# Patient Record
Sex: FEMALE | Race: WHITE | Employment: OTHER | URBAN - METROPOLITAN AREA
[De-identification: names, ages, dates, MRNs, and addresses within clinical notes are randomized per-mention and may not be internally consistent; named-entity substitution may affect disease eponyms.]

---

## 2017-05-24 ENCOUNTER — HOSPITAL ENCOUNTER (OUTPATIENT)
Dept: WOUND CARE | Age: 67
Discharge: HOME | End: 2017-05-24
Payer: MEDICARE

## 2017-05-24 PROCEDURE — 11042 DBRDMT SUBQ TIS 1ST 20SQCM/<: CPT

## 2017-05-24 PROCEDURE — 29580 STRAPPING UNNA BOOT: CPT

## 2017-05-31 PROCEDURE — 11042 DBRDMT SUBQ TIS 1ST 20SQCM/<: CPT

## 2017-05-31 PROCEDURE — 29580 STRAPPING UNNA BOOT: CPT

## 2017-05-31 PROCEDURE — 97602 WOUND(S) CARE NON-SELECTIVE: CPT

## 2017-06-07 ENCOUNTER — HOSPITAL ENCOUNTER (OUTPATIENT)
Dept: WOUND CARE | Age: 67
Discharge: HOME | End: 2017-06-07
Payer: MEDICARE

## 2017-06-07 PROCEDURE — 11042 DBRDMT SUBQ TIS 1ST 20SQCM/<: CPT

## 2017-06-14 PROCEDURE — 15276 SKIN SUB GRAFT F/N/HF/G ADDL: CPT

## 2017-06-14 PROCEDURE — 11042 DBRDMT SUBQ TIS 1ST 20SQCM/<: CPT

## 2017-06-14 PROCEDURE — 15275 SKIN SUB GRAFT FACE/NK/HF/G: CPT

## 2017-06-23 PROCEDURE — 29580 STRAPPING UNNA BOOT: CPT

## 2018-01-11 ENCOUNTER — HOSPITAL ENCOUNTER (EMERGENCY)
Age: 68
Discharge: ANOTHER HOSPITAL | End: 2018-01-12
Attending: EMERGENCY MEDICINE
Payer: MEDICARE

## 2018-01-11 ENCOUNTER — APPOINTMENT (OUTPATIENT)
Dept: GENERAL RADIOLOGY | Age: 68
End: 2018-01-11
Attending: PHYSICIAN ASSISTANT
Payer: MEDICARE

## 2018-01-11 VITALS
HEART RATE: 91 BPM | SYSTOLIC BLOOD PRESSURE: 120 MMHG | OXYGEN SATURATION: 95 % | BODY MASS INDEX: 30.65 KG/M2 | WEIGHT: 173 LBS | HEIGHT: 63 IN | DIASTOLIC BLOOD PRESSURE: 81 MMHG | RESPIRATION RATE: 11 BRPM | TEMPERATURE: 98.8 F

## 2018-01-11 DIAGNOSIS — L03.115 BILATERAL CELLULITIS OF LOWER LEG: ICD-10-CM

## 2018-01-11 DIAGNOSIS — M86.172 OSTEOMYELITIS OF FOOT, LEFT, ACUTE (HCC): Primary | ICD-10-CM

## 2018-01-11 DIAGNOSIS — L03.116 BILATERAL CELLULITIS OF LOWER LEG: ICD-10-CM

## 2018-01-11 DIAGNOSIS — Z79.01 ANTICOAGULANT LONG-TERM USE: ICD-10-CM

## 2018-01-11 DIAGNOSIS — E87.6 HYPOKALEMIA: ICD-10-CM

## 2018-01-11 DIAGNOSIS — A41.9 SEPSIS, DUE TO UNSPECIFIED ORGANISM: ICD-10-CM

## 2018-01-11 DIAGNOSIS — D64.9 ANEMIA, UNSPECIFIED TYPE: ICD-10-CM

## 2018-01-11 LAB
ALBUMIN SERPL-MCNC: 2.3 G/DL (ref 3.4–5)
ALBUMIN/GLOB SERPL: 0.5 {RATIO} (ref 0.8–1.7)
ALP SERPL-CCNC: 131 U/L (ref 45–117)
ALT SERPL-CCNC: 18 U/L (ref 13–56)
ANION GAP SERPL CALC-SCNC: 7 MMOL/L (ref 3–18)
APPEARANCE UR: CLEAR
APTT PPP: 57.3 SEC (ref 23–36.4)
AST SERPL-CCNC: 19 U/L (ref 15–37)
BASOPHILS # BLD: 0 K/UL (ref 0–0.06)
BASOPHILS NFR BLD: 0 % (ref 0–2)
BILIRUB SERPL-MCNC: 0.3 MG/DL (ref 0.2–1)
BILIRUB UR QL: NEGATIVE
BNP SERPL-MCNC: 4216 PG/ML (ref 0–900)
BUN SERPL-MCNC: 17 MG/DL (ref 7–18)
BUN/CREAT SERPL: 11 (ref 12–20)
CALCIUM SERPL-MCNC: 8.7 MG/DL (ref 8.5–10.1)
CHLORIDE SERPL-SCNC: 98 MMOL/L (ref 100–108)
CK MB CFR SERPL CALC: 1.9 % (ref 0–4)
CK MB SERPL-MCNC: 4.6 NG/ML (ref 5–25)
CK SERPL-CCNC: 242 U/L (ref 26–192)
CO2 SERPL-SCNC: 32 MMOL/L (ref 21–32)
COLOR UR: YELLOW
CREAT SERPL-MCNC: 1.49 MG/DL (ref 0.6–1.3)
CRP SERPL-MCNC: 21 MG/DL (ref 0–0.3)
DIFFERENTIAL METHOD BLD: ABNORMAL
EOSINOPHIL # BLD: 0.2 K/UL (ref 0–0.4)
EOSINOPHIL NFR BLD: 1 % (ref 0–5)
ERYTHROCYTE [DISTWIDTH] IN BLOOD BY AUTOMATED COUNT: 17.9 % (ref 11.6–14.5)
ERYTHROCYTE [SEDIMENTATION RATE] IN BLOOD: 106 MM/HR (ref 0–30)
EST. AVERAGE GLUCOSE BLD GHB EST-MCNC: 146 MG/DL
GLOBULIN SER CALC-MCNC: 4.7 G/DL (ref 2–4)
GLUCOSE SERPL-MCNC: 128 MG/DL (ref 74–99)
GLUCOSE UR STRIP.AUTO-MCNC: NEGATIVE MG/DL
HBA1C MFR BLD: 6.7 % (ref 4.5–5.6)
HCT VFR BLD AUTO: 28.5 % (ref 35–45)
HGB BLD-MCNC: 8.4 G/DL (ref 12–16)
HGB UR QL STRIP: NEGATIVE
INR PPP: 2 (ref 0.8–1.2)
KETONES UR QL STRIP.AUTO: NEGATIVE MG/DL
LACTATE SERPL-SCNC: 1 MMOL/L (ref 0.4–2)
LACTATE SERPL-SCNC: 2.1 MMOL/L (ref 0.4–2)
LEUKOCYTE ESTERASE UR QL STRIP.AUTO: NEGATIVE
LYMPHOCYTES # BLD: 2.3 K/UL (ref 0.9–3.6)
LYMPHOCYTES NFR BLD: 14 % (ref 21–52)
MAGNESIUM SERPL-MCNC: 1.8 MG/DL (ref 1.6–2.6)
MCH RBC QN AUTO: 21.7 PG (ref 24–34)
MCHC RBC AUTO-ENTMCNC: 29.5 G/DL (ref 31–37)
MCV RBC AUTO: 73.6 FL (ref 74–97)
MONOCYTES # BLD: 0.8 K/UL (ref 0.05–1.2)
MONOCYTES NFR BLD: 5 % (ref 3–10)
NEUTS SEG # BLD: 13.3 K/UL (ref 1.8–8)
NEUTS SEG NFR BLD: 80 % (ref 40–73)
NITRITE UR QL STRIP.AUTO: NEGATIVE
PH UR STRIP: 5 [PH] (ref 5–8)
PLATELET # BLD AUTO: 524 K/UL (ref 135–420)
PMV BLD AUTO: 8.5 FL (ref 9.2–11.8)
POTASSIUM SERPL-SCNC: 2.8 MMOL/L (ref 3.5–5.5)
PROT SERPL-MCNC: 7 G/DL (ref 6.4–8.2)
PROT UR STRIP-MCNC: NEGATIVE MG/DL
PROTHROMBIN TIME: 21.9 SEC (ref 11.5–15.2)
RBC # BLD AUTO: 3.87 M/UL (ref 4.2–5.3)
RBC MORPH BLD: ABNORMAL
SODIUM SERPL-SCNC: 137 MMOL/L (ref 136–145)
SP GR UR REFRACTOMETRY: 1.01 (ref 1–1.03)
TROPONIN I SERPL-MCNC: 0.02 NG/ML (ref 0–0.06)
UROBILINOGEN UR QL STRIP.AUTO: 1 EU/DL (ref 0.2–1)
WBC # BLD AUTO: 16.6 K/UL (ref 4.6–13.2)

## 2018-01-11 PROCEDURE — 87040 BLOOD CULTURE FOR BACTERIA: CPT | Performed by: PHYSICIAN ASSISTANT

## 2018-01-11 PROCEDURE — 74011250636 HC RX REV CODE- 250/636: Performed by: PHYSICIAN ASSISTANT

## 2018-01-11 PROCEDURE — 77030013140 HC MSK NEB VYRM -A

## 2018-01-11 PROCEDURE — 74011000250 HC RX REV CODE- 250: Performed by: PHYSICIAN ASSISTANT

## 2018-01-11 PROCEDURE — 96366 THER/PROPH/DIAG IV INF ADDON: CPT

## 2018-01-11 PROCEDURE — 83605 ASSAY OF LACTIC ACID: CPT | Performed by: PHYSICIAN ASSISTANT

## 2018-01-11 PROCEDURE — 96375 TX/PRO/DX INJ NEW DRUG ADDON: CPT

## 2018-01-11 PROCEDURE — 96365 THER/PROPH/DIAG IV INF INIT: CPT

## 2018-01-11 PROCEDURE — 83735 ASSAY OF MAGNESIUM: CPT | Performed by: PHYSICIAN ASSISTANT

## 2018-01-11 PROCEDURE — 96368 THER/DIAG CONCURRENT INF: CPT

## 2018-01-11 PROCEDURE — 94640 AIRWAY INHALATION TREATMENT: CPT

## 2018-01-11 PROCEDURE — 96376 TX/PRO/DX INJ SAME DRUG ADON: CPT

## 2018-01-11 PROCEDURE — 81003 URINALYSIS AUTO W/O SCOPE: CPT | Performed by: EMERGENCY MEDICINE

## 2018-01-11 PROCEDURE — 80053 COMPREHEN METABOLIC PANEL: CPT | Performed by: PHYSICIAN ASSISTANT

## 2018-01-11 PROCEDURE — 73630 X-RAY EXAM OF FOOT: CPT

## 2018-01-11 PROCEDURE — 85610 PROTHROMBIN TIME: CPT | Performed by: PHYSICIAN ASSISTANT

## 2018-01-11 PROCEDURE — 93005 ELECTROCARDIOGRAM TRACING: CPT

## 2018-01-11 PROCEDURE — 93970 EXTREMITY STUDY: CPT

## 2018-01-11 PROCEDURE — 96361 HYDRATE IV INFUSION ADD-ON: CPT

## 2018-01-11 PROCEDURE — 83880 ASSAY OF NATRIURETIC PEPTIDE: CPT | Performed by: PHYSICIAN ASSISTANT

## 2018-01-11 PROCEDURE — 87186 SC STD MICRODIL/AGAR DIL: CPT | Performed by: PHYSICIAN ASSISTANT

## 2018-01-11 PROCEDURE — 87077 CULTURE AEROBIC IDENTIFY: CPT | Performed by: PHYSICIAN ASSISTANT

## 2018-01-11 PROCEDURE — 99285 EMERGENCY DEPT VISIT HI MDM: CPT

## 2018-01-11 PROCEDURE — 82553 CREATINE MB FRACTION: CPT | Performed by: PHYSICIAN ASSISTANT

## 2018-01-11 PROCEDURE — 85730 THROMBOPLASTIN TIME PARTIAL: CPT | Performed by: PHYSICIAN ASSISTANT

## 2018-01-11 PROCEDURE — 83036 HEMOGLOBIN GLYCOSYLATED A1C: CPT | Performed by: PHYSICIAN ASSISTANT

## 2018-01-11 PROCEDURE — 87070 CULTURE OTHR SPECIMN AEROBIC: CPT | Performed by: PHYSICIAN ASSISTANT

## 2018-01-11 PROCEDURE — 85652 RBC SED RATE AUTOMATED: CPT | Performed by: PHYSICIAN ASSISTANT

## 2018-01-11 PROCEDURE — 71045 X-RAY EXAM CHEST 1 VIEW: CPT

## 2018-01-11 PROCEDURE — 87086 URINE CULTURE/COLONY COUNT: CPT | Performed by: PHYSICIAN ASSISTANT

## 2018-01-11 PROCEDURE — 86140 C-REACTIVE PROTEIN: CPT | Performed by: PHYSICIAN ASSISTANT

## 2018-01-11 PROCEDURE — 85025 COMPLETE CBC W/AUTO DIFF WBC: CPT | Performed by: PHYSICIAN ASSISTANT

## 2018-01-11 RX ORDER — ONDANSETRON 2 MG/ML
4 INJECTION INTRAMUSCULAR; INTRAVENOUS
Status: COMPLETED | OUTPATIENT
Start: 2018-01-11 | End: 2018-01-11

## 2018-01-11 RX ORDER — WARFARIN 1 MG/1
4 TABLET ORAL DAILY
COMMUNITY

## 2018-01-11 RX ORDER — POTASSIUM CHLORIDE 7.45 MG/ML
10 INJECTION INTRAVENOUS
Status: COMPLETED | OUTPATIENT
Start: 2018-01-11 | End: 2018-01-11

## 2018-01-11 RX ORDER — METFORMIN HYDROCHLORIDE 500 MG/1
500 TABLET ORAL 2 TIMES DAILY WITH MEALS
COMMUNITY

## 2018-01-11 RX ORDER — VANCOMYCIN HCL IN 5 % DEXTROSE 1.25 G/25
1250 PLASTIC BAG, INJECTION (ML) INTRAVENOUS ONCE
Status: COMPLETED | OUTPATIENT
Start: 2018-01-11 | End: 2018-01-11

## 2018-01-11 RX ORDER — OMEPRAZOLE 40 MG/1
40 CAPSULE, DELAYED RELEASE ORAL 2 TIMES DAILY
COMMUNITY

## 2018-01-11 RX ORDER — FENTANYL 25 UG/1
1 PATCH TRANSDERMAL
COMMUNITY

## 2018-01-11 RX ORDER — ALBUTEROL SULFATE 0.83 MG/ML
2.5 SOLUTION RESPIRATORY (INHALATION)
Status: COMPLETED | OUTPATIENT
Start: 2018-01-11 | End: 2018-01-11

## 2018-01-11 RX ORDER — ASPIRIN 325 MG
325 TABLET ORAL DAILY
COMMUNITY

## 2018-01-11 RX ORDER — MORPHINE SULFATE 4 MG/ML
4 INJECTION INTRAVENOUS
Status: COMPLETED | OUTPATIENT
Start: 2018-01-11 | End: 2018-01-11

## 2018-01-11 RX ORDER — FENTANYL CITRATE 50 UG/ML
50 INJECTION, SOLUTION INTRAMUSCULAR; INTRAVENOUS
Status: COMPLETED | OUTPATIENT
Start: 2018-01-11 | End: 2018-01-11

## 2018-01-11 RX ORDER — TRAMADOL HYDROCHLORIDE 50 MG/1
50 TABLET ORAL
COMMUNITY

## 2018-01-11 RX ORDER — SODIUM CHLORIDE 0.9 % (FLUSH) 0.9 %
5-10 SYRINGE (ML) INJECTION AS NEEDED
Status: DISCONTINUED | OUTPATIENT
Start: 2018-01-11 | End: 2018-01-12 | Stop reason: HOSPADM

## 2018-01-11 RX ORDER — BUDESONIDE AND FORMOTEROL FUMARATE DIHYDRATE 160; 4.5 UG/1; UG/1
2 AEROSOL RESPIRATORY (INHALATION)
Status: DISCONTINUED | OUTPATIENT
Start: 2018-01-11 | End: 2018-01-11 | Stop reason: CLARIF

## 2018-01-11 RX ORDER — FUROSEMIDE 80 MG/1
40 TABLET ORAL DAILY
COMMUNITY

## 2018-01-11 RX ORDER — GABAPENTIN 600 MG/1
600 TABLET ORAL
COMMUNITY

## 2018-01-11 RX ORDER — INSULIN GLARGINE 100 [IU]/ML
30 INJECTION, SOLUTION SUBCUTANEOUS
COMMUNITY

## 2018-01-11 RX ADMIN — ALBUTEROL SULFATE 2.5 MG: 2.5 SOLUTION RESPIRATORY (INHALATION) at 21:52

## 2018-01-11 RX ADMIN — FENTANYL CITRATE 50 MCG: 50 INJECTION, SOLUTION INTRAMUSCULAR; INTRAVENOUS at 16:42

## 2018-01-11 RX ADMIN — SODIUM CHLORIDE 500 ML: 900 INJECTION, SOLUTION INTRAVENOUS at 16:41

## 2018-01-11 RX ADMIN — ONDANSETRON 4 MG: 2 INJECTION INTRAMUSCULAR; INTRAVENOUS at 20:31

## 2018-01-11 RX ADMIN — MORPHINE SULFATE 4 MG: 4 INJECTION INTRAVENOUS at 23:37

## 2018-01-11 RX ADMIN — ONDANSETRON 4 MG: 2 INJECTION INTRAMUSCULAR; INTRAVENOUS at 23:37

## 2018-01-11 RX ADMIN — SODIUM CHLORIDE 500 ML: 900 INJECTION, SOLUTION INTRAVENOUS at 18:41

## 2018-01-11 RX ADMIN — POTASSIUM CHLORIDE 10 MEQ: 10 INJECTION, SOLUTION INTRAVENOUS at 18:57

## 2018-01-11 RX ADMIN — MORPHINE SULFATE 4 MG: 4 INJECTION INTRAVENOUS at 18:42

## 2018-01-11 RX ADMIN — POTASSIUM CHLORIDE 10 MEQ: 10 INJECTION, SOLUTION INTRAVENOUS at 20:32

## 2018-01-11 RX ADMIN — PIPERACILLIN SODIUM AND TAZOBACTAM SODIUM 4.5 G: .5; 4 INJECTION, POWDER, LYOPHILIZED, FOR SOLUTION INTRAVENOUS at 18:42

## 2018-01-11 RX ADMIN — VANCOMYCIN HYDROCHLORIDE 1250 MG: 10 INJECTION, POWDER, LYOPHILIZED, FOR SOLUTION INTRAVENOUS at 20:02

## 2018-01-11 RX ADMIN — ONDANSETRON 4 MG: 2 INJECTION INTRAMUSCULAR; INTRAVENOUS at 18:42

## 2018-01-11 RX ADMIN — ONDANSETRON 4 MG: 2 INJECTION INTRAMUSCULAR; INTRAVENOUS at 16:41

## 2018-01-11 NOTE — ED TRIAGE NOTES
Pt presents to ED today via EMS for c/o left foot pain. Pt has wound on left foot that is currently being treated by home health. Pt sees Dr. Lashawn Mtz for this. Pt has Fentanyl patch on right arm and takes tramadol as well for pain. Pt states she did not take any tramadol today. Pt demanding pain medicine upon arrival. Pt has foul smelling left foot with mal-aligned toes and wound to medial side.

## 2018-01-11 NOTE — ED PROVIDER NOTES
EMERGENCY DEPARTMENT HISTORY AND PHYSICAL EXAM    Date: 1/11/2018  Patient Name: Yane Mccurdy    History of Presenting Illness     Chief Complaint   Patient presents with    Foot Pain       History Provided By: Patient    Chief Complaint: foot pain  Duration: 2 Weeks  Location: left   Severity: 10 out of 10  Associated Symptoms: left foot swelling, left foot erythema, left foot wound, left wound drainage, left foot odor, fever, nausea, vomiting, left hip pain, and left sided back pain. Additional History (Context):   2:48 PM  Yane Mccurdy is a 74 y.o. female with PMHX of HTN, IDDM, PAD, COPD (still smoking), AFib, DVT (on coumadin), and diabetic neuropathy who presents to the emergency department via EMS C/O 10/10 left foot swelling, erythema onset 2 weeks ago. Associated symptoms include foul malodorous drainage, subjective fever, nausea and vomiting. Baseline blood sugar 170-200. Followed Dr. Julian Oliveira, [facility] for chronic diabetic ulcer in left foot. Pt receiving home health wound care. Reports she fallen on the floor twice in the past 3 days. Pmhx of CHF, MI, COPD, and Afib (rx Coumadin). Pain medication include Fentanyl patch and Tramadol. Tobacco usage 1/2 pack daily. Pt denies  any other Sx or complaints. PCP: Yakelin Rocha, DO    Current Outpatient Prescriptions   Medication Sig Dispense Refill    gabapentin (NEURONTIN) 600 mg tablet Take 600 mg by mouth five (5) times daily.  fentaNYL (DURAGESIC) 25 mcg/hr PATCH 1 Patch by TransDERmal route every seventy-two (72) hours.  traMADol (ULTRAM) 50 mg tablet Take 50 mg by mouth every six (6) hours as needed for Pain.  metFORMIN (GLUCOPHAGE) 500 mg tablet Take 500 mg by mouth two (2) times daily (with meals).  insulin glargine (LANTUS) 100 unit/mL injection 30 Units by SubCUTAneous route nightly.  furosemide (LASIX) 80 mg tablet Take 80 mg by mouth two (2) times a day.       omeprazole (PRILOSEC) 40 mg capsule Take 40 mg by mouth two (2) times a day.  aspirin (ASPIRIN) 325 mg tablet Take 325 mg by mouth daily.  warfarin (COUMADIN) 1 mg tablet Take 1 mg by mouth daily. Past History     Past Medical History:  Past Medical History:   Diagnosis Date    [facility]     Asthma     Back pain     CAD (coronary artery disease)     Cardiac disease     Cardi[facility]opathy ([facility]     Cellulitis of foot     [facility]congestive heart failure) ([facility])     COPD (chronic obstructive pulmonary disease) ([facility])     COPD (chronic obstructive pulmonary disease) ([facility])     Decreased exercise tolerance     Depression     Diabetes ([facility])     Embolism and thrombosis of arteries of lower extremity ([facility])     Foot ulcer ([facility])     Head ache     HTN (hypertension)     Hypercholesteremia     Hypoxia     Ischemic ulcer ([facility])     Kidney disease     Leg pain     Leg weakness     MRSA (methicillin resistant staph aureus) culture positive     Mural thrombus of cardiac apex     Muscle atrophy     Neuropathy     Osteomyelitis ([facility])     PAD (peripheral artery disease) ([facility])     SIRS (systemic inflammatory response syndrome) ([facility])     UTI (lower urinary tract infection)     V tach ([facility])        Past Surgical History:  Past Surgical History:   Procedure Laterality Date    HX APPENDECTOMY      HX CORONARY STENT PLACEMENT      HX FREE SKIN GRAFT      HX HERNIA REPAIR      HX HYSTERECTOMY      HX OTHER SURGICAL      femoral endarectomy    HX THROMBECTOMY      HX TUBAL LIGATION      CT DEBRIDEMENT OPEN WOUND 20 SQ CM<      VASCULAR SURGERY PROCEDURE UNLIST         Family History:  Family History   Problem Relation Age of Onset    Heart Disease Mother     Hypertension Mother        Social History:  Social History   Substance Use Topics    Smoking status: Current Every Day Smoker    Smokeless tobacco: Never Used    Alcohol use No       Allergies:   Allergies   Allergen Reactions    Codeine Rash         Review of Systems   Review of Systems   Constitutional: Positive for fever. Negative for chills. Respiratory: Negative for cough and shortness of breath. Cardiovascular: Positive for leg swelling. Gastrointestinal: Positive for nausea and vomiting. Negative for abdominal pain. Genitourinary: Negative for dysuria. Musculoskeletal: Positive for arthralgias (left hip pain), back pain (left sided) and myalgias (left foot pain). Negative for joint swelling.        (+) left foot swelling  (+) left foot odor     Skin: Positive for color change (left foot erythema) and wound (left foot ). Negative for rash. (+) left foot wound drainage   Neurological: Positive for numbness (chronic neuropathy in feet). Negative for weakness. Hematological: Negative for adenopathy. Psychiatric/Behavioral: Negative for confusion. All other systems reviewed and are negative. Physical Exam     Vitals:    01/11/18 1416 01/11/18 1830 01/11/18 2230   BP: 138/88 127/65 120/81   Pulse: 95 97 91   Resp: 16 16 11   Temp: 98.8 °F (37.1 °C)     SpO2: 98% 96% 95%   Weight: 78.5 kg (173 lb)     Height: 5' 3\" (1.6 m)       Physical Exam   Constitutional: She appears well-developed and well-nourished. She appears ill. No distress. Chronically ill appearing  female in NAD. Alert. Answering questions. Following directions. HENT:   Head: Normocephalic and atraumatic. Right Ear: External ear normal.   Left Ear: External ear normal.   Nose: Nose normal.   Mouth/Throat: Uvula is midline and oropharynx is clear and moist. Mucous membranes are dry. Eyes: Conjunctivae are normal. Right eye exhibits no discharge. Left eye exhibits no discharge. Neck: Normal range of motion. Cardiovascular: Normal rate, regular rhythm, normal heart sounds and intact distal pulses. Exam reveals no gallop and no friction rub. No murmur heard.   Pulses:       Dorsalis pedis pulses are 1+ on the right side, and 1+ on the left side. Posterior tibial pulses are 1+ on the right side, and 1+ on the left side. Doppler appreciated to BLE at PT/DP pulses   Pulmonary/Chest: Effort normal and breath sounds normal. No accessory muscle usage. No tachypnea. No respiratory distress. She has no decreased breath sounds. She has no wheezes. She has no rhonchi. She has no rales. Musculoskeletal:        Right lower leg: She exhibits tenderness, swelling and edema. Left lower leg: She exhibits tenderness and swelling. Legs:       Left foot: There is swelling and deformity (chronic appearing). Feet:    Neurological: She is alert. Skin: Skin is warm and dry. No rash noted. She is not diaphoretic. No erythema. Psychiatric: She has a normal mood and affect. Judgment normal.   Nursing note and vitals reviewed. Diagnostic Study Results     Labs -     No results found for this or any previous visit (from the past 12 hour(s)). Radiologic Studies -  XR FOOT LT MIN 3 V   Final Result    IMPRESSION:     Dislocated second MTP joint. Mild irregularity distal cortical surface medially  of the second metatarsal head, possibly chronic degenerative, cannot entirely  exclude an erosion which could be seen with osteomyelitis. Please correlate  clinically with regards to the site of the patient's infection.     Third through fifth hammertoes with overlap, no other definite erosive changes. Postoperative changes first metatarsal, partial amputation.     No soft tissue gas. As read by the radiologist.      XR CHEST PORT   Final Result    IMPRESSION:     Nonspecific mild reticular interstitial prominence. No focal consolidation. As read by the radiologist.      DUPLEX LOWER EXT VENOUS BILAT      Duplex images were obtained using 2-D gray scale, color flow, and  spectral Doppler analysis. Right leg :  1.  Deep vein(s) visualized include the common femoral, deep femoral,  proximal femoral, mid femoral, distal femoral, popliteal(above knee),  popliteal(fossa) and popliteal(below knee) veins. 2. No evidence of deep venous thrombosis detected in the veins  visualized. 3. No evidence of superficial thrombosis detected.     Left leg :  1. Deep vein(s) visualized include the common femoral, deep femoral,  proximal femoral, mid femoral, distal femoral, popliteal(above knee),  popliteal(fossa) and popliteal(below knee) veins. 2. No evidence of deep venous thrombosis detected in the veins  visualized. 3. No evidence of superficial thrombosis detected.     ADDITIONAL COMMENTS  Bilateral calf vessels not visualized secondary to swelling     I have personally reviewed the data relevant to the interpretation of  this study.     TECHNOLOGIST: Samson Setting  Signed: 01/11/2018 05:31 PM     CT Results  (Last 48 hours)    None        CXR Results  (Last 48 hours)               01/11/18 1551  XR CHEST PORT Final result    Impression:  IMPRESSION:       Nonspecific mild reticular interstitial prominence. No focal consolidation. Narrative:  EXAM: Chest, portable upright, one view       INDICATION: COPD. COMPARISON: None.       _______________       FINDINGS:       Cardiac silhouette is top normal in size. Mild reticular interstitial markings   are present throughout both lungs. No focal consolidation. The costophrenic   angles are sharp. No pneumothorax. Old, healed bilateral rib fracture   deformities are noted.        _______________                 Medications given in the ED-  Medications   fentaNYL citrate (PF) injection 50 mcg (50 mcg IntraVENous Given 1/11/18 1642)   ondansetron (ZOFRAN) injection 4 mg (4 mg IntraVENous Given 1/11/18 1641)   sodium chloride 0.9 % bolus infusion 500 mL (0 mL IntraVENous IV Completed 1/11/18 2000)   vancomycin (VANCOCIN) 1250 mg in D5W 250 mL infusion (1,250 mg IntraVENous Given 1/11/18 2002)   piperacillin-tazobactam (ZOSYN) 4.5 g in  ml premix/cpmd (4.5 g IntraVENous Given 1/11/18 1842) potassium chloride 10 mEq in 100 ml IVPB (0 mEq IntraVENous IV Completed 1/11/18 2000)   sodium chloride 0.9 % bolus infusion 500 mL (0 mL IntraVENous IV Completed 1/11/18 2000)   morphine 4 mg (4 mg IntraVENous Given 1/11/18 1842)   ondansetron (ZOFRAN) injection 4 mg (4 mg IntraVENous Given 1/11/18 1842)   ondansetron (ZOFRAN) injection 4 mg (4 mg IntraVENous Given 1/11/18 2031)   potassium chloride 10 mEq in 100 ml IVPB (0 mEq IntraVENous IV Completed 1/11/18 2147)   albuterol (PROVENTIL VENTOLIN) nebulizer solution 2.5 mg (2.5 mg Nebulization Given 1/11/18 2152)   morphine 4 mg (4 mg IntraVENous Given 1/11/18 2337)   ondansetron (ZOFRAN) injection 4 mg (4 mg IntraVENous Given 1/11/18 2337)         Medical Decision Making   I am the first provider for this patient. I reviewed the vital signs, available nursing notes, past medical history, past surgical history, family history and social history. Vital Signs-Reviewed the patient's vital signs. Pulse Oximetry Analysis - 98% on RA     EKG interpretation: (Preliminary)  Rate 93 bpm. Rhythm: Sinus rhythm with premature atrial complexes. Left axis deviation. RBBB. Anteroseptal infarct. No acute ischemic changes. Inverted T waves in V2-V4. EKG read by Mary Omalley PA-C at 4:30 PM    Records Reviewed: Nursing Notes and Old Medical Records    Procedures:  Procedures    ED Course:   2:48 PM Initial assessment performed. The patients presenting problems have been discussed, and they are in agreement with the care plan formulated and outlined with them. I have encouraged them to ask questions as they arise throughout their visit.     SEPSIS ASSESSMENT NOTE:   3:05 PM  Mary Omalley PA-C has seen and assessed the patient as follows:    Capillary Refill:abnormal/slow  Cardiopulmonary Evaluation:   Lung Sounds: clear   Cardiac Sounds: irregular  Peripheral Pulses: weak, doppler  Skin Exam: erythematous, warm    Visit Vitals    /81    Pulse 91    Temp 98.8 °F (37.1 °C)    Resp 11    Ht 5' 3\" (1.6 m)    Wt 78.5 kg (173 lb)    SpO2 95%    BMI 30.65 kg/m2     4:56 PM Patient has meet sepsis criteria. Will give antibiotics. Blood pressure remains stable. 5:38 PM :Attempts to contact Zh[facility]  without succuss so called on call podiatrist Milton Elam. Discussed patient's history, exam, and available diagnostics results with , podiatry, who agree with treatment and attempting wound culture. He wants us to order MRI with and without and admit to medicine. 5:55 PM Discussed patient's history, exam, and available diagnostics results with Enrrique Thomas MD, hospitalist, who does not feel comfortable admitting pt because there is no ID coverage at this hospitals. He recommends transfer to [facility]. 6:00 PM Discussed with pt and she request transfer to [place], [facility] Will contact Medicine for transfer. 6:21 PM Discussed patient's history, exam, and available diagnostics results with Milton Elam podiatrist, who does not have privileges at [facility]. I will contact medicine for transfer. 6:58 PM Received call from [facility] transfer stating the Hospitalist has two critical pts and will not be able to call back for at least an hour. 7:03 PM Discussed patient's history, exam, and available diagnostics results with Beto Dimas MD, ED attending , who agree with plan. Will consult [place]. 8:05 PM  Will give second run of K+ as await call back from [facility]:  7:45 PM  Patient's presentation, labs/imaging and plan of care was reviewed with Jameel Biswas PA-C. as part of sign out. They will consult [place] for transfer as part of the plan discussed with the patient.     Jameel Biswas PA-C.'s assistance in completion of this plan is greatly appreciated but it should be noted that I will be the provider of record for this patient.  -Cristóbal Lofton PA-C    CONSULT NOTE:   8:17 PM  Jameel Biswas AUBREY spoke with Dr. Tierney Christine   Specialty: Pulmonology  Discussed pt's hx, disposition, and available diagnostic and imaging results  over the telephone. Reviewed care plans. Consulting physician agrees with plans as outlined. Dr. Tierney Christine approves transfer to [facility] Krause [facility] of [facility]. Written by Nora Hernandez PA-C, ED Scribe, as dictated by Bentley Saleh PA-C    9:06 PM  Pt is stable. Takes Budesonide and Albuterol nebulizer 4x daily. Pt needs treatment now. Will order for same. Diagnosis and Disposition       CLINICAL IMPRESSION:    1. Osteomyelitis of foot, left, acute ([address]    2. Sepsis, due to unspecified organism ([address]    3. Bilateral cellulitis of lower leg    4. Hypokalemia    5. Anticoagulant long-term use    6. Anemia, unspecified type      7:48 PM  I have spent 45 minutes of critical care time involved in lab review, consultations with specialist, family decision-making, and documentation. During this entire length of time I was immediately available to the patient. Critical Care: The reason for providing this level of medical care for this critically ill patient was due a critical illness that impaired one or more vital organ systems such that there was a high probability of imminent or life threatening deterioration in the patients condition. This care involved high complexity decision making to assess, manipulate, and support vital system functions, to treat this degreee vital organ system failure and to prevent further life threatening deterioration of the patients condition. _______________________________    Attestations: This note is prepared by Mireille Mayfield and Tanya Bocuher, acting as Scribe for Nicanor Villegas PA-C. Nicanor Villegas PA-C:  The scribe's documentation has been prepared under my direction and personally reviewed by me in its entirety.   I confirm that the note above accurately reflects all work, treatment, procedures, and medical decision making performed dolores cole.  _______________________________  .

## 2018-01-11 NOTE — PROCEDURES
[facility]  *** FINAL REPORT ***    Name: Nicola Peacock  MRN: [id number]    Outpatient  : 20 Aug 1950  HIS Order #: [id number]  [id number] [facility] Visit #: [id number]  Date: 2018    TYPE OF TEST: Peripheral Venous Testing    REASON FOR TEST  Limb swelling    Right Leg:-  Deep venous thrombosis:           No  Superficial venous thrombosis:    No  Deep venous insufficiency:        Not examined  Superficial venous insufficiency: Not examined    Left Leg:-  Deep venous thrombosis:           No  Superficial venous thrombosis:    No  Deep venous insufficiency:        Not examined  Superficial venous insufficiency: Not examined      INTERPRETATION/FINDINGS  Duplex images were obtained using 2-D gray scale, color flow, and  spectral Doppler analysis. Right leg :  1. Deep vein(s) visualized include the common femoral, deep femoral,  proximal femoral, mid femoral, distal femoral, popliteal(above knee),  popliteal(fossa) and popliteal(below knee) veins. 2. No evidence of deep venous thrombosis detected in the veins  visualized. 3. No evidence of superficial thrombosis detected. Left leg :  1. Deep vein(s) visualized include the common femoral, deep femoral,  proximal femoral, mid femoral, distal femoral, popliteal(above knee),  popliteal(fossa) and popliteal(below knee) veins. 2. No evidence of deep venous thrombosis detected in the veins  visualized. 3. No evidence of superficial thrombosis detected. ADDITIONAL COMMENTS  Bilateral calf vessels not visualized secondary to swelling    I have personally reviewed the data relevant to the interpretation of  this  study. TECHNOLOGIST: Charles Rawls  Signed: 2018 05:31 PM    PHYSICIAN: Crista Schilling.  Niraj Briscoe MD  Signed: 2018 01:52 PM

## 2018-01-11 NOTE — PROGRESS NOTES
Pharmacy Dosing Services: Vancomycin     Consult for Vancomycin Dosing by Pharmacy by Sasha Saeed provided for this 74y.o. year old female , for indication of skin and soft tissue infection . Day of Therapy 1    Ht Readings from Last 1 Encounters:   01/11/18 160 cm (63\")        Wt Readings from Last 1 Encounters:   01/11/18 78.5 kg (173 lb)      Other Current Antibiotics Zosyn 4.5 gm x 1 dose    Serum Creatinine Lab Results   Component Value Date/Time    Creatinine 1.49 01/11/2018 04:15 PM      Creatinine Clearance Estimated Creatinine Clearance: 37.3 mL/min (based on Cr of 1.49). BUN Lab Results   Component Value Date/Time    BUN 17 01/11/2018 04:15 PM      WBC Lab Results   Component Value Date/Time    WBC 16.6 01/11/2018 04:15 PM      H/H Lab Results   Component Value Date/Time    HGB 8.4 01/11/2018 04:15 PM      Platelets Lab Results   Component Value Date/Time    PLATELET 314 85/49/9209 04:15 PM      Temp 98.8 °F (37.1 °C)     Start Vancomycin therapy with a loading dose of 1250 mg. Follow with maintenance dose of 750 mg every 24 hours. Dose calculated to approximate a therapeutic trough of 10-15 mcg/mL. Pharmacy to follow daily and will make changes to dose and/or frequency based on clinical status.     Pharmacist Damian Knight, [facility]

## 2018-01-11 NOTE — ED NOTES
Fentanyl patch (25mcg/hr) removed from R upper arm prior to admin of pain medications ordered per MD. Removal/waste witnessed by Dano Dixon RN.

## 2018-01-12 NOTE — ED NOTES
Pt received from previous nurse. Pt alert and oriented x3. No sigsn of acute distress. VS stab le on cardiac monitor. Pt with left lower extremity cellulitis with diabetic ulcers to foot. Foot raw. Foul smelling and purulent. IV antibiotics being given as ordered. Pt with left and right 20G AC PIV with no signs of infiltration. Continue to monitor. Maintain safety precautions.

## 2018-01-12 NOTE — ED NOTES
Pt being transferred to [facility] for ID consult. Pt stable. NO signs of acute distress. PT to be given pain medications as ordered. Pt complaining of pain to left foot. VS stable. TRANSFER - OUT REPORT:    Verbal report given to Ivone Boles(name) on Murali Jordan  being transferred to [facility] for routine progression of care       Report consisted of patients Situation, Background, Assessment and   Recommendations(SBAR). Information from the following report(s) SBAR, ED Summary, Procedure Summary, MAR and Recent Results was reviewed with the receiving nurse. Lines:   Peripheral IV 01/11/18 Left Antecubital (Active)   Site Assessment Clean, dry, & intact 1/11/2018  4:15 PM   Phlebitis Assessment 0 1/11/2018  4:15 PM   Infiltration Assessment 0 1/11/2018  4:15 PM   Dressing Status Clean, dry, & intact 1/11/2018  4:15 PM   Dressing Type Tape;Transparent 1/11/2018  4:15 PM   Hub Color/Line Status Blue;Capped 1/11/2018  4:15 PM   Action Taken Blood drawn 1/11/2018  4:15 PM   Alcohol Cap Used Yes 1/11/2018  4:15 PM       Peripheral IV 01/11/18 Left Antecubital (Active)   Dressing Type Transparent 1/11/2018  6:53 PM   Hub Color/Line Status Blue;Flushed 1/11/2018  6:53 PM        Opportunity for questions and clarification was provided.       Patient transported with:  EMS via [facility]

## 2018-01-12 NOTE — ED NOTES
Pt going with bilateral AC PIV in place. NO signs of infiltration noted. Removed in chart for documentation purposes.

## 2018-01-13 LAB
ATRIAL RATE: 93 BPM
BACTERIA SPEC CULT: NORMAL
CALCULATED P AXIS, ECG09: 60 DEGREES
CALCULATED R AXIS, ECG10: -41 DEGREES
CALCULATED T AXIS, ECG11: 51 DEGREES
DIAGNOSIS, 93000: NORMAL
P-R INTERVAL, ECG05: 184 MS
Q-T INTERVAL, ECG07: 412 MS
QRS DURATION, ECG06: 126 MS
QTC CALCULATION (BEZET), ECG08: 512 MS
SERVICE CMNT-IMP: NORMAL
VENTRICULAR RATE, ECG03: 93 BPM

## 2018-01-14 LAB
BACTERIA SPEC CULT: ABNORMAL
BACTERIA SPEC CULT: ABNORMAL
GRAM STN SPEC: ABNORMAL
GRAM STN SPEC: ABNORMAL
SERVICE CMNT-IMP: ABNORMAL

## 2018-01-15 NOTE — PROGRESS NOTES
Noted result. Patient was transferred and admitted to [facility] for ID coverage.   TIFFANIE Azevedo 7:19 AM

## 2018-01-17 LAB
BACTERIA SPEC CULT: NORMAL
SERVICE CMNT-IMP: NORMAL

## 2018-03-01 ENCOUNTER — HOSPITAL ENCOUNTER (OUTPATIENT)
Dept: LAB | Age: 68
Discharge: HOME | End: 2018-03-01

## 2018-03-01 LAB
INR PPP: 2.4 (ref 0.8–1.2)
PROTHROMBIN TIME: 25.5 SEC (ref 11.5–15.2)

## 2018-03-01 PROCEDURE — 85610 PROTHROMBIN TIME: CPT | Performed by: INTERNAL MEDICINE

## 2018-03-08 ENCOUNTER — HOSPITAL ENCOUNTER (OUTPATIENT)
Dept: LAB | Age: 68
Discharge: HOME | End: 2018-03-08

## 2018-03-08 LAB
INR PPP: 3.3 (ref 0.8–1.2)
PROTHROMBIN TIME: 32.9 SEC (ref 11.5–15.2)

## 2018-03-08 PROCEDURE — 85610 PROTHROMBIN TIME: CPT | Performed by: FAMILY MEDICINE

## 2018-03-09 ENCOUNTER — HOME HEALTH ADMISSION (OUTPATIENT)
Dept: HOME HEALTH SERVICES | Facility: HOME HEALTH | Age: 68
End: 2018-03-09

## 2018-03-12 ENCOUNTER — HOME CARE VISIT (OUTPATIENT)
Dept: SCHEDULING | Facility: HOME HEALTH | Age: 68
End: 2018-03-12

## 2018-03-13 ENCOUNTER — HOME CARE VISIT (OUTPATIENT)
Dept: HOME HEALTH SERVICES | Facility: HOME HEALTH | Age: 68
End: 2018-03-13

## 2018-03-13 VITALS — RESPIRATION RATE: 16 BRPM | TEMPERATURE: 97.6 F | HEART RATE: 90 BPM | OXYGEN SATURATION: 97 %
